# Patient Record
Sex: FEMALE | Race: OTHER | HISPANIC OR LATINO | URBAN - METROPOLITAN AREA
[De-identification: names, ages, dates, MRNs, and addresses within clinical notes are randomized per-mention and may not be internally consistent; named-entity substitution may affect disease eponyms.]

---

## 2017-01-23 ENCOUNTER — OUTPATIENT (OUTPATIENT)
Dept: OUTPATIENT SERVICES | Facility: HOSPITAL | Age: 22
LOS: 1 days | Discharge: HOME | End: 2017-01-23

## 2017-06-27 DIAGNOSIS — E05.00 THYROTOXICOSIS WITH DIFFUSE GOITER WITHOUT THYROTOXIC CRISIS OR STORM: ICD-10-CM

## 2019-06-03 PROBLEM — Z00.00 ENCOUNTER FOR PREVENTIVE HEALTH EXAMINATION: Status: ACTIVE | Noted: 2019-06-03

## 2020-02-26 ENCOUNTER — EMERGENCY (EMERGENCY)
Facility: HOSPITAL | Age: 25
LOS: 0 days | Discharge: HOME | End: 2020-02-26
Attending: EMERGENCY MEDICINE | Admitting: EMERGENCY MEDICINE
Payer: MEDICAID

## 2020-02-26 VITALS
TEMPERATURE: 99 F | DIASTOLIC BLOOD PRESSURE: 81 MMHG | RESPIRATION RATE: 18 BRPM | HEART RATE: 96 BPM | OXYGEN SATURATION: 100 % | SYSTOLIC BLOOD PRESSURE: 121 MMHG | WEIGHT: 109.35 LBS

## 2020-02-26 DIAGNOSIS — Z79.2 LONG TERM (CURRENT) USE OF ANTIBIOTICS: ICD-10-CM

## 2020-02-26 DIAGNOSIS — Z79.52 LONG TERM (CURRENT) USE OF SYSTEMIC STEROIDS: ICD-10-CM

## 2020-02-26 DIAGNOSIS — J02.9 ACUTE PHARYNGITIS, UNSPECIFIED: ICD-10-CM

## 2020-02-26 DIAGNOSIS — J03.90 ACUTE TONSILLITIS, UNSPECIFIED: ICD-10-CM

## 2020-02-26 PROCEDURE — 99284 EMERGENCY DEPT VISIT MOD MDM: CPT

## 2020-02-26 RX ORDER — DEXAMETHASONE 0.5 MG/5ML
10 ELIXIR ORAL ONCE
Refills: 0 | Status: COMPLETED | OUTPATIENT
Start: 2020-02-26 | End: 2020-02-26

## 2020-02-26 RX ADMIN — Medication 10 MILLIGRAM(S): at 09:14

## 2020-02-26 NOTE — ED ADULT TRIAGE NOTE - CHIEF COMPLAINT QUOTE
pt presents to ED c/o sore throat x 3 days, pt saw PMD who stated negative for strep . pt states throat pain is causing pain in left ear. lungs clear . pt states it hurts on left side to swallow

## 2020-02-26 NOTE — ED PROVIDER NOTE - PROGRESS NOTE DETAILS
Patient evaluated by ENT ERICA Mcdermott states no fluctuant abscess- recommends Augmentin and Prednisone PO.

## 2020-02-26 NOTE — CONSULT NOTE ADULT - ASSESSMENT
Pt is a 24 y.o female with L tonsillitis.    ·	PO prednisone  ·	oral abx (augmentin/clinda)  ·	return to ED if develop worsening fever or inability to tolerate PO  ·	w/d with attng

## 2020-02-26 NOTE — CONSULT NOTE ADULT - SUBJECTIVE AND OBJECTIVE BOX
Pt is a 24 y.o female who presents with two day history of throat pain. PT initially developed throat pain on Monday, L>R - went to her PMD who diagnosed with viral illness, strep and mono swab negative. PT developed worsening pain today so came to ED. PT has been able to tolerate her saliva, but having pain with solid food. Pt has not had any recent antibiotics, no history of abscesses in the throat. Denies any SOB/diff breathing.     PAST MEDICAL & SURGICAL HISTORY:  Hypothyroid  No significant past surgical history  Home Medications: NONE  MEDICATIONS  (STANDING):    MEDICATIONS  (PRN):    Allergies    No Known Allergies    Intolerances    A 10 point ROS was performed, negative other than mentioned in HPI.    Vital Signs Last 24 Hrs  T(C): 37 (26 Feb 2020 08:45), Max: 37 (26 Feb 2020 08:45)  T(F): 98.6 (26 Feb 2020 08:45), Max: 98.6 (26 Feb 2020 08:45)  HR: 96 (26 Feb 2020 08:45) (96 - 96)  BP: 121/81 (26 Feb 2020 08:45) (121/81 - 121/81)  BP(mean): --  RR: 18 (26 Feb 2020 08:45) (18 - 18)  SpO2: 100% (26 Feb 2020 08:45) (100% - 100%)    GEN: NAD, awake and alert. no drooling or pooling of secretions. + slightly muffled voice.  SKIN: good color  HEENT: NC/AT; oral mucosa pink. + erythema and edema to L tonsil, no exudates. + uvular edema, midline. no fluctuance or edema noted to the L peritonsillar space. R side WNL  NECK: supple, no LAD  ABDO: soft, NT/ND  RESP: CTA B/L  CARDIO: + S1/S2  EXT: HERMOSILLO x 4

## 2020-02-26 NOTE — ED PROVIDER NOTE - NSFOLLOWUPINSTRUCTIONS_ED_ALL_ED_FT
Tonsillitis    WHAT YOU NEED TO KNOW:    Tonsillitis is inflammation of your tonsils. Tonsils are the lumps of tissue on both sides of the back of your throat. Tonsils are part of your immune system. They help you fight infections. Recurrent tonsillitis is when you have tonsillitis many times in 1 year. Chronic tonsillitis is when you have a sore throat that lasts 3 months or longer. Mouth Anatomy         DISCHARGE INSTRUCTIONS:    Medicines: You may need any of the following:     Acetaminophen decreases pain and fever. It is available without a doctor's order. Ask how much to take and how often to take it. Follow directions. Acetaminophen can cause liver damage if not taken correctly.      NSAIDs, such as ibuprofen, help decrease swelling, pain, and fever. This medicine is available with or without a doctor's order. NSAIDs can cause stomach bleeding or kidney problems in certain people. If you take blood thinner medicine, always ask your healthcare provider if NSAIDs are safe for you. Always read the medicine label and follow directions.      Antibiotics help treat a bacterial infection.      Take your medicine as directed. Contact your healthcare provider if you think your medicine is not helping or if you have side effects. Tell him or her if you are allergic to any medicine. Keep a list of the medicines, vitamins, and herbs you take. Include the amounts, and when and why you take them. Bring the list or the pill bottles to follow-up visits. Carry your medicine list with you in case of an emergency.    Call 911 for the following:     You have trouble breathing because your tonsils are swollen.        Contact your healthcare provider if:     You have a fever.      Your pain gets worse or does not get better after you take pain medicine.      Your sore throat is not better after you have finished antibiotic treatment.      You have trouble sleeping and wake up trying to catch your breath.      You have questions or concerns about your condition or care.    Rest when you feel it is needed. Slowly start to do more each day. Return to your daily activities as directed.     Drink liquids as directed: You may need to drink more liquid than usual to help prevent dehydration. Ask how much liquid to drink each day and which liquids are best for you.     Gargle with warm salt water: This may help decrease throat pain. Mix 1 teaspoon of salt in 8 ounces of warm water. Ask how often you should do this.    Prevent the spread of germs: Wash your hands often. Do not share food or drinks with anyone. You may be able to return to work when you feel better and your fever is gone for at least 24 hours.    Follow up with your healthcare provider as directed: Write down your questions so you remember to ask them during your visits.        © Copyright [employer] 2019 All illustrations and images included in CareNotes are the copyrighted property of [employer] or [employer].

## 2020-02-26 NOTE — ED PROVIDER NOTE - PATIENT PORTAL LINK FT
You can access the FollowMyHealth Patient Portal offered by [facility] by registering at the following website: [email or web address]. By joining [facility]’s FollowMyHealth portal, you will also be able to view your health information using other applications (apps) compatible with our system.

## 2020-02-26 NOTE — ED PROVIDER NOTE - ATTENDING CONTRIBUTION TO CARE
24 year old female, pmhx hypothyroidism presenting with a 2 day history of left-sided throat swelling with odynophagia. Patient states pain is sharp, radiating to her ears, no palliative or provocative factors, moderate severity. Had negative strep test recently as well as monospot but despite this pain is getting worse so she came to the ED. Otherwise denies fevers or systemic symptoms. Able to tolerate PO but with pain.    Vital Signs: I have reviewed the initial vital signs.  Constitutional: NAD, well-nourished, appears stated age, no acute distress.  HEENT: Airway patent, moist MM, (+) pharyngeal erythema but no exudate, significant protrusion of left tonsil but no definitive abscess. EOMI, PERRLA.  CV: regular rate, regular rhythm, well-perfused extremities, 2+ b/l DP and radial pulses equal.  Lungs: BCTA, no increased WOB.  ABD: NTND, no guarding or rebound, no pulsatile mass, no hernias.   MSK: Neck supple, nontender, nl ROM, no stepoff. Chest nontender. Back nontender in TLS spine or to b/l bony structures or flanks. Ext nontender, nl rom, no deformity.   INTEG: Skin warm, dry, no rash.  NEURO: A&Ox3, normal strength, nl sensation throughout, normal speech.   PSYCH: Calm, cooperative, normal affect and interaction.    Will consult ENT for eval of possible peritonsillar abscess, give PO decadron, re-eval.

## 2020-02-26 NOTE — ED PROVIDER NOTE - CLINICAL SUMMARY MEDICAL DECISION MAKING FREE TEXT BOX
Patient presented with worsening pharyngeal erythema, most pronounced on the left. On exam (+) protrusion of left tonsil significantly but no definitive signs of abscess. Otherwise afebrile, Hd stable, well appearing, tolerating secretions, speaking full sentences, no voice changes. Gave decadron with improvement. ENT evaluated patient in ED and recommended PO abx, outpatient follow up. Patient otherwise ambulatory, tolerates PO. Will discharge home with outpatient follow up. Patient agreeable with plan. Agrees to return to ED for any new or worsening symptoms.

## 2020-02-26 NOTE — ED PROVIDER NOTE - NSFOLLOWUPCLINICS_GEN_ALL_ED_FT
[facility]  ENT  [address], [address] [place] [address]  Phone: [phone]  Fax:   Follow Up Time:

## 2020-02-26 NOTE — ED PROVIDER NOTE - OBJECTIVE STATEMENT
25 y/o female with hx Hypothyroidism presents to the ED c/o "I have left sided throat pain and swelling since Monday. It is difficult to swallow and the pain is radiating to my ear now. I saw my PMD and they did a strept test that was negative and took blood to check for mono. The pain and swelling is getting worse." no fever/ chills/ HA/ n/v/d

## 2023-07-12 PROBLEM — E03.9 HYPOTHYROIDISM, UNSPECIFIED: Chronic | Status: ACTIVE | Noted: 2020-02-26

## 2023-07-28 ENCOUNTER — NON-APPOINTMENT (OUTPATIENT)
Age: 28
End: 2023-07-28

## 2023-08-07 ENCOUNTER — APPOINTMENT (OUTPATIENT)
Dept: CARDIOLOGY | Facility: CLINIC | Age: 28
End: 2023-08-07
Payer: MEDICAID

## 2023-08-07 ENCOUNTER — NON-APPOINTMENT (OUTPATIENT)
Age: 28
End: 2023-08-07

## 2023-08-07 VITALS
DIASTOLIC BLOOD PRESSURE: 64 MMHG | WEIGHT: 106 LBS | SYSTOLIC BLOOD PRESSURE: 90 MMHG | BODY MASS INDEX: 20.81 KG/M2 | HEART RATE: 68 BPM | HEIGHT: 60 IN

## 2023-08-07 DIAGNOSIS — K21.9 GASTRO-ESOPHAGEAL REFLUX DISEASE W/OUT ESOPHAGITIS: ICD-10-CM

## 2023-08-07 DIAGNOSIS — F41.9 ANXIETY DISORDER, UNSPECIFIED: ICD-10-CM

## 2023-08-07 DIAGNOSIS — K58.9 IRRITABLE BOWEL SYNDROME W/OUT DIARRHEA: ICD-10-CM

## 2023-08-07 DIAGNOSIS — E05.90 THYROTOXICOSIS, UNSPECIFIED W/OUT THYROTOXIC CRISIS OR STORM: ICD-10-CM

## 2023-08-07 DIAGNOSIS — Z82.49 FAMILY HISTORY OF ISCHEMIC HEART DISEASE AND OTHER DISEASES OF THE CIRCULATORY SYSTEM: ICD-10-CM

## 2023-08-07 PROCEDURE — 93242 EXT ECG>48HR<7D RECORDING: CPT

## 2023-08-07 PROCEDURE — 93000 ELECTROCARDIOGRAM COMPLETE: CPT | Mod: 59

## 2023-08-07 PROCEDURE — 99204 OFFICE O/P NEW MOD 45 MIN: CPT | Mod: 25

## 2023-08-07 RX ORDER — FAMOTIDINE 40 MG/1
40 TABLET, FILM COATED ORAL
Refills: 0 | Status: ACTIVE | COMMUNITY

## 2023-08-07 RX ORDER — ADHESIVE TAPE 3"X 2.3 YD
50 MCG TAPE, NON-MEDICATED TOPICAL
Qty: 30 | Refills: 0 | Status: ACTIVE | COMMUNITY
Start: 2023-06-19

## 2023-08-07 RX ORDER — METHIMAZOLE 10 MG/1
10 TABLET ORAL
Qty: 60 | Refills: 0 | Status: ACTIVE | COMMUNITY
Start: 2023-04-19

## 2023-08-07 RX ORDER — HYDROXYZINE HYDROCHLORIDE 10 MG/1
10 TABLET ORAL
Qty: 90 | Refills: 0 | Status: ACTIVE | COMMUNITY
Start: 2023-05-03

## 2023-08-07 RX ORDER — BUSPIRONE HYDROCHLORIDE 10 MG/1
10 TABLET ORAL
Qty: 60 | Refills: 0 | Status: ACTIVE | COMMUNITY
Start: 2023-07-20

## 2023-08-07 NOTE — PHYSICAL EXAM
[Well Developed] : well developed [Well Nourished] : well nourished [No Acute Distress] : no acute distress [Normal S1, S2] : normal S1, S2 [No Murmur] : no murmur [Normal] : moves all extremities, no focal deficits, normal speech [No Rub] : no rub [No Gallop] : no gallop

## 2023-08-07 NOTE — REVIEW OF SYSTEMS
[Chest Discomfort] : chest discomfort [Palpitations] : palpitations [Dyspnea on exertion] : not dyspnea during exertion [Lower Ext Edema] : no extremity edema [Leg Claudication] : no intermittent leg claudication [Syncope] : no syncope [Anxiety] : no anxiety [Negative] : Neurological

## 2023-08-07 NOTE — HISTORY OF PRESENT ILLNESS
[FreeTextEntry1] : 27 y.o. female with PMH of hyperthyroidism presents to establish care. She complains of palpitations not associated with other symptoms. She reports random chest pain radiating to left arm and back not related to physical activity. She follows with endocrinologist .

## 2023-08-07 NOTE — ASSESSMENT
[FreeTextEntry1] : 27 y.o. female with PMH of hyperthyroidism (euthyroid at this time).  Episodes of palpitations. Episodes of chest pain. Anxiety.  Plan: Repeat TFTs scheduled in Septamber. Holter monitor for 1 week. Exercise stress ECHO. Will request blood work from PCP  F/u after the tests.  Chang Rojas MD

## 2023-08-12 ENCOUNTER — OUTPATIENT (OUTPATIENT)
Dept: OUTPATIENT SERVICES | Facility: HOSPITAL | Age: 28
LOS: 1 days | End: 2023-08-12
Payer: MEDICAID

## 2023-08-12 DIAGNOSIS — Z00.8 ENCOUNTER FOR OTHER GENERAL EXAMINATION: ICD-10-CM

## 2023-08-12 DIAGNOSIS — I69.311 MEMORY DEFICIT FOLLOWING CEREBRAL INFARCTION: ICD-10-CM

## 2023-08-12 PROCEDURE — 70553 MRI BRAIN STEM W/O & W/DYE: CPT | Mod: 26

## 2023-08-12 PROCEDURE — A9579: CPT

## 2023-08-12 PROCEDURE — 70553 MRI BRAIN STEM W/O & W/DYE: CPT

## 2023-08-13 DIAGNOSIS — I69.311 MEMORY DEFICIT FOLLOWING CEREBRAL INFARCTION: ICD-10-CM

## 2023-08-14 ENCOUNTER — TRANSCRIPTION ENCOUNTER (OUTPATIENT)
Age: 28
End: 2023-08-14

## 2023-09-11 ENCOUNTER — APPOINTMENT (OUTPATIENT)
Dept: CARDIOLOGY | Facility: CLINIC | Age: 28
End: 2023-09-11
Payer: MEDICAID

## 2023-09-11 DIAGNOSIS — R00.2 PALPITATIONS: ICD-10-CM

## 2023-09-11 DIAGNOSIS — R07.9 CHEST PAIN, UNSPECIFIED: ICD-10-CM

## 2023-09-11 PROCEDURE — 93351 STRESS TTE COMPLETE: CPT

## 2023-09-11 PROCEDURE — 93320 DOPPLER ECHO COMPLETE: CPT

## 2023-09-11 PROCEDURE — 93325 DOPPLER ECHO COLOR FLOW MAPG: CPT

## 2023-09-19 PROBLEM — R07.9 CHEST PAIN, UNSPECIFIED TYPE: Status: ACTIVE | Noted: 2023-08-07

## 2023-09-19 PROBLEM — R00.2 PALPITATIONS: Status: ACTIVE | Noted: 2023-08-07
